# Patient Record
Sex: FEMALE | Race: WHITE | NOT HISPANIC OR LATINO | Employment: OTHER | URBAN - METROPOLITAN AREA
[De-identification: names, ages, dates, MRNs, and addresses within clinical notes are randomized per-mention and may not be internally consistent; named-entity substitution may affect disease eponyms.]

---

## 2023-05-05 ENCOUNTER — OFFICE VISIT (OUTPATIENT)
Dept: NEUROSURGERY | Facility: CLINIC | Age: 88
End: 2023-05-05
Payer: MEDICARE

## 2023-05-05 VITALS
DIASTOLIC BLOOD PRESSURE: 79 MMHG | BODY MASS INDEX: 20.35 KG/M2 | HEART RATE: 65 BPM | TEMPERATURE: 98 F | SYSTOLIC BLOOD PRESSURE: 145 MMHG | HEIGHT: 61 IN | WEIGHT: 107.8 LBS

## 2023-05-05 DIAGNOSIS — M47.812 CERVICAL SPONDYLOSIS WITHOUT MYELOPATHY: ICD-10-CM

## 2023-05-05 DIAGNOSIS — R29.898 LEFT ARM WEAKNESS: Primary | ICD-10-CM

## 2023-05-05 PROCEDURE — 1159F MED LIST DOCD IN RCRD: CPT | Performed by: PHYSICIAN ASSISTANT

## 2023-05-05 PROCEDURE — 1160F RVW MEDS BY RX/DR IN RCRD: CPT | Performed by: PHYSICIAN ASSISTANT

## 2023-05-05 PROCEDURE — 99203 OFFICE O/P NEW LOW 30 MIN: CPT | Performed by: PHYSICIAN ASSISTANT

## 2023-05-05 RX ORDER — ATORVASTATIN CALCIUM 40 MG/1
1 TABLET, FILM COATED ORAL DAILY
COMMUNITY
Start: 2023-04-25

## 2023-05-05 RX ORDER — ASPIRIN 81 MG/1
81 TABLET ORAL DAILY
COMMUNITY

## 2023-05-05 RX ORDER — ATENOLOL 25 MG/1
25 TABLET ORAL DAILY
COMMUNITY

## 2023-05-05 NOTE — PROGRESS NOTES
Patient: Celsa Sanders  : 1931  Chart #: [id number]    Date of Service: 2023    CHIEF COMPLAINT: Left shoulder weakness    History of Present Illness Ms. Sanders is a very spry 89-year-old woman who presents to our clinic with complaints of left arm weakness that began 6-8 months ago without precipitating event.  She denies any history of pain or sensory alteration.  Per her report her shoulder was evaluated with an MRI and was negative.  She has been treated with formal physical therapy for a few months and has unfortunately not garnered any improvement in her strength.  She denies new or progressive symptoms.  No right-sided symptoms no neck pain.      Past Medical History:   Diagnosis Date   • Arthritis    • Back problem    • HL (hearing loss)    • Hypertension          Current Outpatient Medications:   •  aspirin 81 MG EC tablet, Take 1 tablet by mouth Daily., Disp: , Rfl:   •  atenolol (TENORMIN) 25 MG tablet, Take 1 tablet by mouth Daily., Disp: , Rfl:   •  atorvastatin (LIPITOR) 40 MG tablet, Take 1 tablet by mouth Daily., Disp: , Rfl:     Past Surgical History:   Procedure Laterality Date   • PARTIAL HIP ARTHROPLASTY Left        Social History     Socioeconomic History   • Marital status:    Tobacco Use   • Smoking status: Never   • Smokeless tobacco: Never   Vaping Use   • Vaping Use: Never used   Substance and Sexual Activity   • Alcohol use: Not Currently   • Drug use: Never   • Sexual activity: Defer         Review of Systems   Constitutional: Negative for activity change, appetite change, chills, diaphoresis, fatigue, fever and unexpected weight change.   HENT: Negative for congestion, dental problem, drooling, ear discharge, ear pain, facial swelling, hearing loss, mouth sores, nosebleeds, postnasal drip, rhinorrhea, sinus pressure, sinus pain, sneezing, sore throat, tinnitus, trouble swallowing and voice change.    Eyes: Negative for photophobia, pain, discharge, redness,  "itching and visual disturbance.   Respiratory: Negative for apnea, cough, choking, chest tightness, shortness of breath, wheezing and stridor.    Cardiovascular: Negative for chest pain, palpitations and leg swelling.   Gastrointestinal: Negative for abdominal distention, abdominal pain, anal bleeding, blood in stool, constipation, diarrhea, nausea, rectal pain and vomiting.   Endocrine: Negative for cold intolerance, heat intolerance, polydipsia, polyphagia and polyuria.   Genitourinary: Negative for decreased urine volume, difficulty urinating, dysuria, enuresis, flank pain, frequency, genital sores, hematuria and urgency.   Musculoskeletal: Negative for arthralgias, back pain, gait problem, joint swelling, myalgias, neck pain and neck stiffness.   Skin: Negative for color change, pallor, rash and wound.   Allergic/Immunologic: Negative for environmental allergies, food allergies and immunocompromised state.   Neurological: Positive for weakness. Negative for dizziness, tremors, seizures, syncope, facial asymmetry, speech difficulty, light-headedness, numbness and headaches.   Hematological: Negative for adenopathy. Does not bruise/bleed easily.   Psychiatric/Behavioral: Negative for agitation, behavioral problems, confusion, decreased concentration, dysphoric mood, hallucinations, self-injury, sleep disturbance and suicidal ideas. The patient is not nervous/anxious and is not hyperactive.    All other systems reviewed and are negative.      Objective   Vital Signs: Blood pressure 145/79, pulse 65, temperature 98 °F (36.7 °C), temperature source Infrared, height 154.9 cm (61\"), weight 48.9 kg (107 lb 12.8 oz).  Physical Exam  Vitals and nursing note reviewed.   Constitutional:       General: She is not in acute distress.     Appearance: She is well-developed.   HENT:      Head: Normocephalic and atraumatic.      Comments: Hard of hearing  Pulmonary:      Breath sounds: Normal breath sounds.   Psychiatric:         " Behavior: Behavior normal.         Thought Content: Thought content normal.     Musculoskeletal:     Left deltoid 3/5 above 90 degrees.  No pain or crepitus in the shoulder.     Station and gait are normal.  Neurologic:     Muscle tone is normal throughout.     Coordination is intact.     Deep tendon reflexes: Left bicep absent, right bicep 1+.  All other fields present and equal bilateral     Sensation is intact to light touch throughout.     Patient is oriented to person, place, and time.         Independent review of radiographic imaging: No imaging to review.Report from x-ray of the cervical spine dated 12/15/2022 demonstrates severe multilevel degenerative disc disease and arthropathy.  Unchanged grade 1 anterior listhesis of C7 on T1.    X-ray of the left shoulder from January of this year reads no acute abnormality.  We have not found report of MRI    Assessment & Plan   Diagnosis: Left deltoid weakness possibly from cervical radiculopathy    Medical Decision Making: I am going to refer patient for an MRI of the cervical spine for diagnosis.  Her weakness has been stable over the course of 6-7 months and she has no complaints of pain.  Her best option is to continue with aggressive physical therapy.  It is unlikely that we will recommend a surgical intervention on the basis that she may not regain strength, and given symptoms are not progressive or cause her any pain.  She will follow-up with me to review MRI and further recommendations will be made at that time.    Diagnoses and all orders for this visit:    1. Left arm weakness (Primary)  -     MRI Cervical Spine Without Contrast; Future  -     Ambulatory Referral to Physical Therapy Evaluate and treat (cervical traction); ROM (left arm ), Strengthening    2. Cervical spondylosis without myelopathy                        BMI is within normal parameters. No other follow-up for BMI required.         Rupa Torres PA-C  Patient Care Team:  Ruben Cross  MD MARSHA as PCP - General (Internal Medicine)

## 2023-06-09 ENCOUNTER — OFFICE VISIT (OUTPATIENT)
Dept: NEUROSURGERY | Facility: CLINIC | Age: 88
End: 2023-06-09
Payer: MEDICARE

## 2023-06-09 VITALS — WEIGHT: 106 LBS | TEMPERATURE: 97.1 F | HEIGHT: 61 IN | BODY MASS INDEX: 20.01 KG/M2

## 2023-06-09 DIAGNOSIS — R29.898 LEFT ARM WEAKNESS: Primary | ICD-10-CM

## 2023-06-09 DIAGNOSIS — M47.812 CERVICAL SPONDYLOSIS WITHOUT MYELOPATHY: ICD-10-CM

## 2023-06-09 PROCEDURE — 1159F MED LIST DOCD IN RCRD: CPT | Performed by: PHYSICIAN ASSISTANT

## 2023-06-09 PROCEDURE — 1160F RVW MEDS BY RX/DR IN RCRD: CPT | Performed by: PHYSICIAN ASSISTANT

## 2023-06-09 PROCEDURE — 99213 OFFICE O/P EST LOW 20 MIN: CPT | Performed by: PHYSICIAN ASSISTANT

## 2023-06-09 RX ORDER — TRIAMCINOLONE ACETONIDE 1 MG/G
CREAM TOPICAL
COMMUNITY
Start: 2023-05-09

## 2023-06-09 NOTE — PROGRESS NOTES
Patient: Celsa Sanders  : 1931  Chart #: [id number]    Date of Service: 2023    CHIEF COMPLAINT: Left shoulder weakness    History of Present Illness Ms. Sanders is a very spry 89-year-old woman who presents to our clinic with complaints of left arm weakness that began 6-8 months ago without precipitating event.  She denies any history of pain or sensory alteration.  Per her report, she had evaluation of her shoulder and was told symptoms were coming from her neck.  She has been treated with formal physical therapy for a few months and has unfortunately not garnered any improvement in her strength.  She denies new or progressive symptoms.  No right-sided symptoms no neck pain. I referred her for an MRI of the cervical spine but she did not have that completed. Her range of motion has improved somewhat since last visit. No new symptoms       Past Medical History:   Diagnosis Date    Arthritis     Back problem     HL (hearing loss)     Hypertension          Current Outpatient Medications:     aspirin 81 MG EC tablet, Take 1 tablet by mouth Daily., Disp: , Rfl:     atenolol (TENORMIN) 25 MG tablet, Take 1 tablet by mouth Daily., Disp: , Rfl:     atorvastatin (LIPITOR) 40 MG tablet, Take 1 tablet by mouth Daily., Disp: , Rfl:     triamcinolone (KENALOG) 0.1 % cream, APPLY CREAM EXTERNALLY TO AFFECTED AREA TWICE DAILY, Disp: , Rfl:     Past Surgical History:   Procedure Laterality Date    PARTIAL HIP ARTHROPLASTY Left        Social History     Socioeconomic History    Marital status:    Tobacco Use    Smoking status: Never    Smokeless tobacco: Never   Vaping Use    Vaping Use: Never used   Substance and Sexual Activity    Alcohol use: Not Currently    Drug use: Never    Sexual activity: Defer         Review of Systems   Constitutional:  Negative for activity change, appetite change, chills, diaphoresis, fatigue, fever and unexpected weight change.   HENT:  Negative for congestion, dental  "problem, drooling, ear discharge, ear pain, facial swelling, hearing loss, mouth sores, nosebleeds, postnasal drip, rhinorrhea, sinus pressure, sinus pain, sneezing, sore throat, tinnitus, trouble swallowing and voice change.    Eyes:  Negative for photophobia, pain, discharge, redness, itching and visual disturbance.   Respiratory:  Negative for apnea, cough, choking, chest tightness, shortness of breath, wheezing and stridor.    Cardiovascular:  Negative for chest pain, palpitations and leg swelling.   Gastrointestinal:  Negative for abdominal distention, abdominal pain, anal bleeding, blood in stool, constipation, diarrhea, nausea, rectal pain and vomiting.   Endocrine: Negative for cold intolerance, heat intolerance, polydipsia, polyphagia and polyuria.   Genitourinary:  Negative for decreased urine volume, difficulty urinating, dysuria, enuresis, flank pain, frequency, genital sores, hematuria and urgency.   Musculoskeletal:  Negative for arthralgias, back pain, gait problem, joint swelling, myalgias, neck pain and neck stiffness.   Skin:  Negative for color change, pallor, rash and wound.   Allergic/Immunologic: Negative for environmental allergies, food allergies and immunocompromised state.   Neurological:  Positive for weakness. Negative for dizziness, tremors, seizures, syncope, facial asymmetry, speech difficulty, light-headedness, numbness and headaches.   Hematological:  Negative for adenopathy. Does not bruise/bleed easily.   Psychiatric/Behavioral:  Negative for agitation, behavioral problems, confusion, decreased concentration, dysphoric mood, hallucinations, self-injury, sleep disturbance and suicidal ideas. The patient is not nervous/anxious and is not hyperactive.    All other systems reviewed and are negative.    Objective   Vital Signs: Temperature 97.1 °F (36.2 °C), temperature source Infrared, height 154.9 cm (60.98\"), weight 48.1 kg (106 lb).  Physical Exam  Vitals and nursing note reviewed. "   Constitutional:       General: She is not in acute distress.     Appearance: She is well-developed.   HENT:      Head: Normocephalic and atraumatic.      Comments: Hard of hearing  Pulmonary:      Breath sounds: Normal breath sounds.   Psychiatric:         Behavior: Behavior normal.         Thought Content: Thought content normal.   Musculoskeletal:     Left deltoid 3/5 above 90 degrees.  No pain or crepitus in the shoulder.     Station and gait are normal.  Neurologic:     Muscle tone is normal throughout.     Coordination is intact.     Deep tendon reflexes: Left bicep absent, right bicep 1+.  All other fields present and equal bilateral     Sensation is intact to light touch throughout.     Patient is oriented to person, place, and time.         Independent review of radiographic imaging: No imaging to review.Report from x-ray of the cervical spine dated 12/15/2022 demonstrates severe multilevel degenerative disc disease and arthropathy.  Unchanged grade 1 anterior listhesis of C7 on T1.    X-ray of the left shoulder from January of this year reads no acute abnormality.  We have not found report of MRI    Assessment & Plan   Diagnosis: Left deltoid weakness possibly from cervical radiculopathy    Medical Decision Making: I referred patient for an MRI of the cervical spine for diagnosis; however she was not able to complete the study. Her range of motion has somewhat improved since her last visit. She has no new or progressive symptoms.  At this time, this is not something we would likely recommend surgical intervention for and she does not want to undergo surgery regardless.  I am going to refer her back to physical therapy to see if we can get more improvement. She can call me if she develops pain or progressive weakness and we will try once again for MRI of the cervical spine. For now we will hold off given the stability of her symptoms with are well tolerated.         Diagnoses and all orders for this  visit:    1. Left arm weakness (Primary)  -     Ambulatory Referral to Physical Therapy Evaluate and treat    2. Cervical spondylosis without myelopathy  -     Ambulatory Referral to Physical Therapy Evaluate and treat                        BMI is within normal parameters. No other follow-up for BMI required.         Rupa Torres PA-C  Patient Care Team:  Ruben Cross MD as PCP - General (Internal Medicine)